# Patient Record
Sex: FEMALE | Race: OTHER | Employment: FULL TIME | URBAN - METROPOLITAN AREA
[De-identification: names, ages, dates, MRNs, and addresses within clinical notes are randomized per-mention and may not be internally consistent; named-entity substitution may affect disease eponyms.]

---

## 2024-08-26 ENCOUNTER — HOSPITAL ENCOUNTER (OUTPATIENT)
Age: 34
Discharge: HOME | End: 2024-08-26

## 2024-08-26 ENCOUNTER — OFFICE VISIT (OUTPATIENT)
Dept: PRIMARY CARE CLINIC | Age: 34
End: 2024-08-26

## 2024-08-26 VITALS
HEART RATE: 82 BPM | HEIGHT: 62 IN | OXYGEN SATURATION: 98 % | DIASTOLIC BLOOD PRESSURE: 70 MMHG | SYSTOLIC BLOOD PRESSURE: 114 MMHG | BODY MASS INDEX: 32.94 KG/M2 | WEIGHT: 179 LBS | TEMPERATURE: 98.3 F

## 2024-08-26 DIAGNOSIS — K76.0 FATTY LIVER: ICD-10-CM

## 2024-08-26 DIAGNOSIS — Z76.89 ENCOUNTER TO ESTABLISH CARE: Primary | ICD-10-CM

## 2024-08-26 DIAGNOSIS — Z00.00 ANNUAL PHYSICAL EXAM: ICD-10-CM

## 2024-08-26 DIAGNOSIS — N39.0 URINARY TRACT INFECTION WITHOUT HEMATURIA, SITE UNSPECIFIED: ICD-10-CM

## 2024-08-26 DIAGNOSIS — N89.8 VAGINAL DISCHARGE: ICD-10-CM

## 2024-08-26 DIAGNOSIS — R30.0 DYSURIA: ICD-10-CM

## 2024-08-26 DIAGNOSIS — Z76.89 ENCOUNTER TO ESTABLISH CARE: ICD-10-CM

## 2024-08-26 LAB
ALBUMIN SERPL-MCNC: 4.3 G/DL (ref 3.4–5)
ALBUMIN/GLOB SERPL: 1.2 {RATIO} (ref 1.1–2.2)
ALP SERPL-CCNC: 67 U/L (ref 40–129)
ALT SERPL-CCNC: 19 U/L (ref 10–40)
ANION GAP SERPL CALCULATED.3IONS-SCNC: 11 MMOL/L (ref 3–16)
AST SERPL-CCNC: 21 U/L (ref 15–37)
BASOPHILS # BLD: 0 K/UL (ref 0–0.2)
BASOPHILS NFR BLD: 0.3 %
BILIRUB SERPL-MCNC: 0.3 MG/DL (ref 0–1)
BILIRUBIN, POC: NEGATIVE
BLOOD URINE, POC: ABNORMAL
BUN SERPL-MCNC: 11 MG/DL (ref 7–20)
CALCIUM SERPL-MCNC: 9 MG/DL (ref 8.3–10.6)
CHLORIDE SERPL-SCNC: 104 MMOL/L (ref 99–110)
CHOLEST SERPL-MCNC: 164 MG/DL (ref 0–199)
CLARITY, POC: ABNORMAL
CO2 SERPL-SCNC: 25 MMOL/L (ref 21–32)
COLOR, POC: YELLOW
CREAT SERPL-MCNC: 0.6 MG/DL (ref 0.6–1.1)
DEPRECATED RDW RBC AUTO: 14.8 % (ref 12.4–15.4)
EOSINOPHIL # BLD: 0.2 K/UL (ref 0–0.6)
EOSINOPHIL NFR BLD: 1.8 %
EST. AVERAGE GLUCOSE BLD GHB EST-MCNC: 99.7 MG/DL
GFR SERPLBLD CREATININE-BSD FMLA CKD-EPI: >90 ML/MIN/{1.73_M2}
GLUCOSE SERPL-MCNC: 90 MG/DL (ref 70–99)
GLUCOSE URINE, POC: NEGATIVE
HBA1C MFR BLD: 5.1 %
HCT VFR BLD AUTO: 39.6 % (ref 36–48)
HDLC SERPL-MCNC: 38 MG/DL (ref 40–60)
HGB BLD-MCNC: 13.3 G/DL (ref 12–16)
KETONES, POC: NEGATIVE
LDL CHOLESTEROL: 101 MG/DL
LEUKOCYTE EST, POC: ABNORMAL
LYMPHOCYTES # BLD: 2.7 K/UL (ref 1–5.1)
LYMPHOCYTES NFR BLD: 23.6 %
MCH RBC QN AUTO: 28.4 PG (ref 26–34)
MCHC RBC AUTO-ENTMCNC: 33.7 G/DL (ref 31–36)
MCV RBC AUTO: 84.3 FL (ref 80–100)
MONOCYTES # BLD: 0.8 K/UL (ref 0–1.3)
MONOCYTES NFR BLD: 7 %
NEUTROPHILS # BLD: 7.9 K/UL (ref 1.7–7.7)
NEUTROPHILS NFR BLD: 67.3 %
NITRITE, POC: NEGATIVE
PH, POC: 7
PLATELET # BLD AUTO: 221 K/UL (ref 135–450)
PMV BLD AUTO: 10.3 FL (ref 5–10.5)
POTASSIUM SERPL-SCNC: 4.1 MMOL/L (ref 3.5–5.1)
PROT SERPL-MCNC: 7.8 G/DL (ref 6.4–8.2)
PROTEIN, POC: ABNORMAL
RBC # BLD AUTO: 4.7 M/UL (ref 4–5.2)
SODIUM SERPL-SCNC: 140 MMOL/L (ref 136–145)
SPECIFIC GRAVITY, POC: 1.02
TRIGL SERPL-MCNC: 124 MG/DL (ref 0–150)
UROBILINOGEN, POC: 0.2
VLDLC SERPL CALC-MCNC: 25 MG/DL
WBC # BLD AUTO: 11.7 K/UL (ref 4–11)

## 2024-08-26 PROCEDURE — 85025 COMPLETE CBC W/AUTO DIFF WBC: CPT

## 2024-08-26 PROCEDURE — 81002 URINALYSIS NONAUTO W/O SCOPE: CPT

## 2024-08-26 PROCEDURE — 83036 HEMOGLOBIN GLYCOSYLATED A1C: CPT

## 2024-08-26 PROCEDURE — 80061 LIPID PANEL: CPT

## 2024-08-26 PROCEDURE — 36415 COLL VENOUS BLD VENIPUNCTURE: CPT

## 2024-08-26 PROCEDURE — 99203 OFFICE O/P NEW LOW 30 MIN: CPT

## 2024-08-26 PROCEDURE — 86780 TREPONEMA PALLIDUM: CPT

## 2024-08-26 PROCEDURE — 87390 HIV-1 AG IA: CPT

## 2024-08-26 PROCEDURE — 80053 COMPREHEN METABOLIC PANEL: CPT

## 2024-08-26 PROCEDURE — 99385 PREV VISIT NEW AGE 18-39: CPT

## 2024-08-26 PROCEDURE — 86702 HIV-2 ANTIBODY: CPT

## 2024-08-26 PROCEDURE — 86701 HIV-1ANTIBODY: CPT

## 2024-08-26 SDOH — ECONOMIC STABILITY: FOOD INSECURITY: WITHIN THE PAST 12 MONTHS, YOU WORRIED THAT YOUR FOOD WOULD RUN OUT BEFORE YOU GOT MONEY TO BUY MORE.: NEVER TRUE

## 2024-08-26 SDOH — ECONOMIC STABILITY: FOOD INSECURITY: WITHIN THE PAST 12 MONTHS, THE FOOD YOU BOUGHT JUST DIDN'T LAST AND YOU DIDN'T HAVE MONEY TO GET MORE.: NEVER TRUE

## 2024-08-26 SDOH — ECONOMIC STABILITY: INCOME INSECURITY: HOW HARD IS IT FOR YOU TO PAY FOR THE VERY BASICS LIKE FOOD, HOUSING, MEDICAL CARE, AND HEATING?: NOT HARD AT ALL

## 2024-08-26 ASSESSMENT — ANXIETY QUESTIONNAIRES
3. WORRYING TOO MUCH ABOUT DIFFERENT THINGS: SEVERAL DAYS
7. FEELING AFRAID AS IF SOMETHING AWFUL MIGHT HAPPEN: NOT AT ALL
4. TROUBLE RELAXING: NOT AT ALL
GAD7 TOTAL SCORE: 3
2. NOT BEING ABLE TO STOP OR CONTROL WORRYING: SEVERAL DAYS
6. BECOMING EASILY ANNOYED OR IRRITABLE: NOT AT ALL
5. BEING SO RESTLESS THAT IT IS HARD TO SIT STILL: NOT AT ALL
IF YOU CHECKED OFF ANY PROBLEMS ON THIS QUESTIONNAIRE, HOW DIFFICULT HAVE THESE PROBLEMS MADE IT FOR YOU TO DO YOUR WORK, TAKE CARE OF THINGS AT HOME, OR GET ALONG WITH OTHER PEOPLE: NOT DIFFICULT AT ALL
1. FEELING NERVOUS, ANXIOUS, OR ON EDGE: SEVERAL DAYS

## 2024-08-26 ASSESSMENT — PATIENT HEALTH QUESTIONNAIRE - PHQ9
SUM OF ALL RESPONSES TO PHQ QUESTIONS 1-9: 3
8. MOVING OR SPEAKING SO SLOWLY THAT OTHER PEOPLE COULD HAVE NOTICED. OR THE OPPOSITE, BEING SO FIGETY OR RESTLESS THAT YOU HAVE BEEN MOVING AROUND A LOT MORE THAN USUAL: NOT AT ALL
2. FEELING DOWN, DEPRESSED OR HOPELESS: SEVERAL DAYS
SUM OF ALL RESPONSES TO PHQ QUESTIONS 1-9: 3
5. POOR APPETITE OR OVEREATING: NOT AT ALL
SUM OF ALL RESPONSES TO PHQ9 QUESTIONS 1 & 2: 1
7. TROUBLE CONCENTRATING ON THINGS, SUCH AS READING THE NEWSPAPER OR WATCHING TELEVISION: NOT AT ALL
3. TROUBLE FALLING OR STAYING ASLEEP: NOT AT ALL
10. IF YOU CHECKED OFF ANY PROBLEMS, HOW DIFFICULT HAVE THESE PROBLEMS MADE IT FOR YOU TO DO YOUR WORK, TAKE CARE OF THINGS AT HOME, OR GET ALONG WITH OTHER PEOPLE: NOT DIFFICULT AT ALL
6. FEELING BAD ABOUT YOURSELF - OR THAT YOU ARE A FAILURE OR HAVE LET YOURSELF OR YOUR FAMILY DOWN: SEVERAL DAYS
SUM OF ALL RESPONSES TO PHQ QUESTIONS 1-9: 3
9. THOUGHTS THAT YOU WOULD BE BETTER OFF DEAD, OR OF HURTING YOURSELF: NOT AT ALL
SUM OF ALL RESPONSES TO PHQ QUESTIONS 1-9: 3
1. LITTLE INTEREST OR PLEASURE IN DOING THINGS: NOT AT ALL
4. FEELING TIRED OR HAVING LITTLE ENERGY: SEVERAL DAYS

## 2024-08-26 NOTE — PROGRESS NOTES
PROGRESS NOTE   [facility]                                  [address], [address], [place] [address]         Phone: [phone]    Date of Service:  2024     Patient ID: .Juliet Sanchez is a 34 y.o. female      Subjective:     CC: to establish primary care and concerns for today are burning micturition and vaginal discharge for one month     HPI:  Juliet Sanchez, a 34 years old female, living with her  and 3 kids, Citizen of [place] speaking needed  for this encounter.  Today the patient came in to establish a primary care and her concerns for today are burning with urination and vaginal discharge for last 1 month.   Dysuria:  Patient reports having dysuria for last 1 month  No association of flank/abdominal pain or frequency of urination  No history of fevers, chills or hematuria  No previous history of recurrent UTIs  Vaginal discharge:  Patient reports having vaginal discharge for last 1 month  Whitish in color, thin watery in consistency,  Associated with itching, not blood stained  Patient has not taken any medicines for it.  Fatty Liver:  She wants to get some work done for fatty liver and to discuss its prognosis as well.  She reports that she was diagnosed having fatty liver in , she took treatment for it but she does not remember the name of medicines.  Polycystic Ovaries:  In , she was diagnosed with PCOS and took treatment for it, could not remember medication names.  PMH:  No significant past medical history  No regular medications  Gynecologic History  LMP recorded 2024, regular/average flow  Contraception: tubal ligation  Last pap smear Results: normal  Sexual hx: active with one partner()  no concerns regarding menstrual cycle or sexual health  Obstetric History  : 3  Para: 3  AB: 0  C-sections  Social History:  Smoking No  Alcohol   No  Recreational drugs  palpable  Bilateral axillary lymph nodes are not palpably enlarged  Pap smear:   Prior to examination, verbal consent obtained from patient to perform routine Pap Smear and breast exam. Dr. Christine Hollingsworth(Attending) served as a chaperon for the procedure. A medium size speculum was used with visualization of the cervix. Cytology brush and swab were used to collect specimens. No overt vaginal bleeding or discharge noted. Speculum was removed with incident and patient tolerated procedure well.   Vaginal swabs for chlamydia and Gonorrhea taken.    Assessment / Plan:      Encounter to establish care  - Chart/records reviewed,   - BMI and vitals addressed,   - health maintenance addressed and updated, presenting problems addressed.   - labs ordered  - Lipid, Fasting; Future  - CBC with Auto Differential; Future  - Hemoglobin A1C; Future  - Comprehensive Metabolic Panel; Future  - PAP smear and chlamydia/gonorrhea swabs taken    Dysuria  - Reported no associated flank pain or fever/chills  - She took no medication for it.  - POCT Urinalysis no Micro  - Urinalysis with Microscopic ([place] ONLY)  - Will follow labs and plan urine culture if needed    Vaginal discharge  - Reports no history of recurrent episodes of vaginal infections, no fever/chills or purulent discharge  - HIV Screen; Future  - VAGINAL PATHOGENS PROBE *A  - Syphilis Antibody Cascading Reflex; Future  - POCT Urinalysis no Micro  - Urinalysis with Microscopic ([place] ONLY)  - C.trachomatis N.gonorrhoeae DNA  - Will follow labs for further management    Fatty liver  -no acute complain of abdominal pain or vomiting    -no previous lipid evaluation record available  -Advised liver ultrasound  - Lipid, Fasting; Future  - Hemoglobin A1C; Future  - Comprehensive Metabolic Panel; Future  - will follow labs and imaging    Preventative health care  Discussed safe sexual practices as pt is sexually active with   Discussed if pt interested in getting due

## 2024-08-26 NOTE — PATIENT INSTRUCTIONS
We are drawing some labs today.  If you have MyChart, you will see the results first, but our office will reach out to you in the next few days to over the results.

## 2024-08-27 LAB
BACTERIA URNS QL MICRO: ABNORMAL /HPF
BILIRUB UR QL STRIP.AUTO: NEGATIVE
CANDIDA DNA VAG QL NAA+PROBE: ABNORMAL
CLARITY UR: ABNORMAL
COLOR UR: YELLOW
CRYSTALS URNS MICRO: ABNORMAL /HPF
EPI CELLS #/AREA URNS AUTO: 2 /HPF (ref 0–5)
G VAGINALIS DNA SPEC QL NAA+PROBE: ABNORMAL
GLUCOSE UR STRIP.AUTO-MCNC: NEGATIVE MG/DL
HGB UR QL STRIP.AUTO: NEGATIVE
HIV 1+2 AB+HIV1 P24 AG SERPL QL IA: NORMAL
HIV 2 AB SERPL QL IA: NORMAL
HIV1 AB SERPL QL IA: NORMAL
HIV1 P24 AG SERPL QL IA: NORMAL
HYALINE CASTS #/AREA URNS AUTO: 0 /LPF (ref 0–8)
KETONES UR STRIP.AUTO-MCNC: NEGATIVE MG/DL
LEUKOCYTE ESTERASE UR QL STRIP.AUTO: ABNORMAL
NITRITE UR QL STRIP.AUTO: NEGATIVE
PH UR STRIP.AUTO: 6.5 [PH] (ref 5–8)
PROT UR STRIP.AUTO-MCNC: ABNORMAL MG/DL
RBC CLUMPS #/AREA URNS AUTO: 3 /HPF (ref 0–4)
REAGIN+T PALLIDUM IGG+IGM SERPL-IMP: NORMAL
SP GR UR STRIP.AUTO: 1.02 (ref 1–1.03)
T VAGINALIS DNA VAG QL NAA+PROBE: ABNORMAL
UA DIPSTICK W REFLEX MICRO PNL UR: YES
URN SPEC COLLECT METH UR: ABNORMAL
UROBILINOGEN UR STRIP-ACNC: 0.2 E.U./DL
WBC #/AREA URNS AUTO: 25 /HPF (ref 0–5)

## 2024-08-28 LAB
C TRACH DNA CVX QL NAA+PROBE: NEGATIVE
N GONORRHOEA DNA SPEC QL NAA+PROBE: NEGATIVE

## 2024-08-28 NOTE — RESULT ENCOUNTER NOTE
Called the patient using .  Discussed lab results.  Discussed about urinalysis and advised oral antibiotics cephalexin.  Advise to give urine culture sample before starting antibiotics.  Discussed about trichomonas vaginalis infection and advised metronidazole.  Advised her to abstain from sex for neck 7 days during the course of antibiotics and let her  know so that he can get the treatment for infection.  Discussed about lipid panel and advise exercise and high-fiber diet, and avoidance of high cholesterol foods.  Patient verbalizes understanding of discussion.

## 2024-08-29 DIAGNOSIS — A59.01 TRICHOMONAS VAGINALIS (TV) INFECTION: Primary | ICD-10-CM

## 2024-08-29 DIAGNOSIS — N39.0 URINARY TRACT INFECTION WITHOUT HEMATURIA, SITE UNSPECIFIED: ICD-10-CM

## 2024-08-29 RX ORDER — CEPHALEXIN 500 MG/1
500 CAPSULE ORAL 2 TIMES DAILY
Qty: 14 CAPSULE | Refills: 0 | Status: SHIPPED | OUTPATIENT
Start: 2024-08-29 | End: 2024-09-05

## 2024-08-29 RX ORDER — METRONIDAZOLE 500 MG/1
500 TABLET ORAL 2 TIMES DAILY
Qty: 14 TABLET | Refills: 0 | Status: SHIPPED | OUTPATIENT
Start: 2024-08-29 | End: 2024-09-05

## 2024-08-30 ENCOUNTER — TELEPHONE (OUTPATIENT)
Dept: PRIMARY CARE CLINIC | Age: 34
End: 2024-08-30

## 2024-08-30 DIAGNOSIS — N39.0 URINARY TRACT INFECTION WITHOUT HEMATURIA, SITE UNSPECIFIED: Primary | ICD-10-CM

## 2024-08-30 DIAGNOSIS — K76.0 FATTY LIVER: ICD-10-CM

## 2024-08-30 NOTE — TELEPHONE ENCOUNTER
Patient is in office this morning to to get the orders for her urine and her Ultra sound the orders are not in chart and she was needing to reschedule her appointment due to her daughter going into the hospital.Please advise [phone]

## 2024-09-10 ENCOUNTER — OFFICE VISIT (OUTPATIENT)
Dept: PRIMARY CARE CLINIC | Age: 34
End: 2024-09-10

## 2024-09-10 VITALS
HEART RATE: 76 BPM | OXYGEN SATURATION: 99 % | DIASTOLIC BLOOD PRESSURE: 68 MMHG | SYSTOLIC BLOOD PRESSURE: 110 MMHG | BODY MASS INDEX: 33.42 KG/M2 | WEIGHT: 177 LBS | HEIGHT: 61 IN

## 2024-09-10 DIAGNOSIS — R30.0 DYSURIA: ICD-10-CM

## 2024-09-10 DIAGNOSIS — N89.8 VAGINAL DISCHARGE: ICD-10-CM

## 2024-09-10 DIAGNOSIS — A59.01 TRICHOMONAS VAGINALIS (TV) INFECTION: ICD-10-CM

## 2024-09-10 DIAGNOSIS — Z00.00 ENCOUNTER FOR WELL ADULT EXAM WITHOUT ABNORMAL FINDINGS: Primary | ICD-10-CM

## 2024-09-10 DIAGNOSIS — Z23 NEED FOR VACCINATION AGAINST HEPATITIS B VIRUS: ICD-10-CM

## 2024-09-10 PROCEDURE — 99395 PREV VISIT EST AGE 18-39: CPT

## 2024-09-10 RX ORDER — METRONIDAZOLE 500 MG/1
500 TABLET ORAL 3 TIMES DAILY
Qty: 21 TABLET | Refills: 0 | Status: SHIPPED | OUTPATIENT
Start: 2024-09-10 | End: 2024-09-17

## 2024-09-10 RX ORDER — METRONIDAZOLE 500 MG/1
500 TABLET ORAL 3 TIMES DAILY
COMMUNITY

## 2024-09-10 RX ORDER — CEPHALEXIN 500 MG/1
500 CAPSULE ORAL 4 TIMES DAILY
COMMUNITY

## 2024-10-01 ASSESSMENT — ENCOUNTER SYMPTOMS
VOMITING: 0
NAUSEA: 0
CONSTIPATION: 0
SHORTNESS OF BREATH: 0
DIARRHEA: 0

## 2024-10-01 NOTE — PROGRESS NOTES
[facility]  Family and Community Medicine [facility]  [address] [facility], [address], [place] [address]  Phone: [phone]    Name:  Juliet Sanchez  :    1990    Chief Complaint:     Possible UTI    HPI:     Juliet Sanchez is a 34 y.o. female who  has a past medical history of Fatty liver and PCOS (polycystic ovarian syndrome). She presents today for possible UTI. Visit was performed with virtual .    Patient reports original symptoms of dysuria starting back in August.  She was prescribed Keflex 500 mg 4 times a day and completed a 7-day course of that.  Her symptoms resolved and then started back up 4 days ago.  She reports only dysuria.  She denies fever, chills, nausea, vomiting, vaginal discharge, hematuria.    Unrelated, she reports chronic right sided abdominal pain.  She states she has a history of fatty liver and is saying that the symptoms are very similar to when she had her first fatty liver pain.  An ultrasound was ordered that she has not had completed yet.    Past Medical History:    Past Medical History:   Diagnosis Date    Fatty liver     PCOS (polycystic ovarian syndrome)      Past Surgical History:  Past Surgical History:   Procedure Laterality Date     SECTION      x3    TUBAL LIGATION       Home Meds:  Prior to Visit Medications    Medication Sig Taking? Authorizing Provider   sulfamethoxazole-trimethoprim (BACTRIM DS;SEPTRA DS) 800-160 MG per tablet Take 1 tablet by mouth 2 times daily for 7 days Yes Nick Rogel DO   cephALEXin (KEFLEX) 500 MG capsule Take 1 capsule by mouth 4 times daily  Patient not taking: Reported on 10/2/2024  Beatrice Matthews MD   metroNIDAZOLE (FLAGYL) 500 MG tablet Take 1 tablet by mouth 3 times daily  Patient not taking: Reported on 10/2/2024  Beatrice Matthews MD     Allergies:    Patient has no known allergies.  Family History:   History reviewed. No pertinent

## 2024-10-02 ENCOUNTER — OFFICE VISIT (OUTPATIENT)
Dept: PRIMARY CARE CLINIC | Age: 34
End: 2024-10-02

## 2024-10-02 ENCOUNTER — HOSPITAL ENCOUNTER (OUTPATIENT)
Age: 34
Discharge: HOME | End: 2024-10-02

## 2024-10-02 VITALS
TEMPERATURE: 98.1 F | SYSTOLIC BLOOD PRESSURE: 120 MMHG | BODY MASS INDEX: 33.48 KG/M2 | OXYGEN SATURATION: 97 % | WEIGHT: 177.2 LBS | DIASTOLIC BLOOD PRESSURE: 68 MMHG | HEART RATE: 78 BPM

## 2024-10-02 DIAGNOSIS — R30.0 DYSURIA: Primary | ICD-10-CM

## 2024-10-02 LAB
BILIRUBIN, POC: NEGATIVE
BLOOD URINE, POC: NEGATIVE
CLARITY, POC: CLEAR
COLOR, POC: YELLOW
GLUCOSE URINE, POC: NEGATIVE MG/DL
KETONES, POC: NEGATIVE MG/DL
LEUKOCYTE EST, POC: NEGATIVE
NITRITE, POC: NEGATIVE
PH, POC: 6
PROTEIN, POC: NEGATIVE MG/DL
SPECIFIC GRAVITY, POC: 1.02
UROBILINOGEN, POC: NORMAL MG/DL

## 2024-10-02 RX ORDER — SULFAMETHOXAZOLE/TRIMETHOPRIM 800-160 MG
1 TABLET ORAL 2 TIMES DAILY
Qty: 14 TABLET | Refills: 0 | Status: SHIPPED | OUTPATIENT
Start: 2024-10-02 | End: 2024-10-09

## 2024-10-03 LAB — BACTERIA UR CULT: NORMAL

## 2024-10-14 ENCOUNTER — HOSPITAL ENCOUNTER (OUTPATIENT)
Dept: ULTRASOUND IMAGING | Age: 34
Discharge: HOME | End: 2024-10-14

## 2024-10-14 DIAGNOSIS — K76.0 FATTY LIVER: ICD-10-CM

## 2024-10-14 PROCEDURE — 76705 ECHO EXAM OF ABDOMEN: CPT

## 2024-10-15 DIAGNOSIS — D18.03 LIVER HEMANGIOMA: Primary | ICD-10-CM

## 2024-10-15 NOTE — RESULT ENCOUNTER NOTE
Called the patient using  and informed her about liver ultrasound report showing 3.8 cm nodule/hemangioma with coarse texture of liver. Dicussed the need of Abdominal MRI with and without contrast and possible GI referral after MRI report. She verbalized her understanding and shows her interest to visit clinic for further discussion after MRI.

## 2024-10-28 ENCOUNTER — HOSPITAL ENCOUNTER (OUTPATIENT)
Dept: MRI IMAGING | Age: 34
Discharge: HOME | End: 2024-10-28

## 2024-10-28 DIAGNOSIS — D18.03 LIVER HEMANGIOMA: ICD-10-CM

## 2024-10-28 PROCEDURE — 74183 MRI ABD W/O CNTR FLWD CNTR: CPT

## 2024-10-28 PROCEDURE — 6360000004 HC RX CONTRAST MEDICATION

## 2024-10-28 PROCEDURE — A9579 GAD-BASE MR CONTRAST NOS,1ML: HCPCS

## 2024-10-28 RX ADMIN — GADOTERIDOL 15 ML: 279.3 INJECTION, SOLUTION INTRAVENOUS at 10:33

## 2024-10-30 NOTE — RESULT ENCOUNTER NOTE
Called the patient using , she has not received the call, left voice mail using . In message we have informed her about her MRI abdomen report showing fatty infiltration. As per her abdominal ultrasound there was a suspicion of liver nodule but on MRI there is no hepatic nodule so no further intervention is required. For fatty liver, she can do a follow up in clinic.

## 2024-11-08 ENCOUNTER — OFFICE VISIT (OUTPATIENT)
Dept: PRIMARY CARE CLINIC | Age: 34
End: 2024-11-08

## 2024-11-08 VITALS
WEIGHT: 179.2 LBS | OXYGEN SATURATION: 100 % | SYSTOLIC BLOOD PRESSURE: 112 MMHG | DIASTOLIC BLOOD PRESSURE: 68 MMHG | HEART RATE: 78 BPM | BODY MASS INDEX: 33.86 KG/M2

## 2024-11-08 DIAGNOSIS — K76.0 FATTY LIVER: Primary | ICD-10-CM

## 2024-11-08 DIAGNOSIS — M25.561 RIGHT KNEE PAIN, UNSPECIFIED CHRONICITY: ICD-10-CM

## 2024-11-08 PROCEDURE — 99214 OFFICE O/P EST MOD 30 MIN: CPT

## 2024-11-08 RX ORDER — IBUPROFEN 800 MG/1
800 TABLET, FILM COATED ORAL 2 TIMES DAILY PRN
Qty: 180 TABLET | Refills: 1 | Status: SHIPPED | OUTPATIENT
Start: 2024-11-08

## 2024-11-08 NOTE — PROGRESS NOTES
PROGRESS NOTE   [facility]                                  [address], [address], [facility] [address]         Phone: [phone]    Date of Service:  11/8/2024     Patient ID: .Juliet Sanchez is a 34 y.o. female      Subjective:     CC: Juliet Sanchez is a 34 y.o. female who  has a past medical history of Fatty liver and PCOS (polycystic ovarian syndrome). She presents today for follow-up of her ultrasound and MRI done for fatty liver    HPI  Juliet Sanchez, a 34 years old female, living with her  and 3 kids, [place] speaking needed  for this encounter.  Today the patient came in for follow-up of her abdominal ultrasound and MRI.    Fatty Liver:  She has a past history of diagnosed fatty liver but she did not have any past medical records and she wanted to get some work done for fatty liver and to discuss its prognosis as well.  She reports that she was diagnosed having fatty liver in 2019, she took treatment for it but she does not remember the name of medicines.  Her ultrasound abdomen showed 3.8 cm nodule in the right hepatic lobe which was later reevaluated by MRI abdomen.  MRI abdomen confirmed fatty infiltration of liver and revealed no evidence of any hepatic nodule.  Liver function tests are normal.   No history of alcohol intake, illicit drugs, herbal supplements, regular NSAIDs, any hormones intake.  No family history of any liver disease.    Metabolic Syndrome:  Patient has a past medical history of polycystic ovaries.  In the light of her PCOS and family history of diabetes, patient was evaluated for metabolic syndrome.  Her blood pressure readings during her visits remain normal and she denies any history of hypertension.  Her BMI is 33.86 kg today.  Her BMD cholesterol is normal but HDL cholesterol is a little bit lower than the normal range.  Hemoglobin A1c 5.1.  She does

## 2024-11-08 NOTE — PATIENT INSTRUCTIONS
Continue dietary restriction  Avoid fatty foods and high carb diet  Start doing regular exercise 20 minutes walk/aerobics, 5 times a week  Target is to loose weight by 5 lb in next month  Provided dietary guide and resources  Apply voltral gel on the affected area  Take Tablet Ibuprofen if needed for knee pain  Check your CMP after 3 months  Flu vaccine given in may 2024